# Patient Record
(demographics unavailable — no encounter records)

---

## 2024-10-17 NOTE — HISTORY OF PRESENT ILLNESS
[FreeTextEntry1] : 63-year-old male CPE History of mild coronary artery calcification, colitis, hyperlipidemia, and melanoma [de-identified] :  See "CC" sheet

## 2024-10-17 NOTE — COUNSELING
[de-identified] : Discussed with patient importance of healthy diet, regular exercise (> 2.5 hours/week), and maintaining healthy weight.

## 2024-10-17 NOTE — ASSESSMENT
[FreeTextEntry1] : Mild coronary artery calcification.  Stable Hyperlipidemia.  Fasting lipid.  Patient understands risk of cardiovascular disease associated with hyperlipidemia.  Patient refuses cholesterol medication. History of melanoma.  Followed by dermatologist History of colitis.  Followed by GI Patient declines influenza vaccine today COVID-19  booster vaccine is recommended once a year in fall.  CPE in 1 year Blood was drawn at office today.